# Patient Record
Sex: MALE | Race: WHITE | NOT HISPANIC OR LATINO
[De-identification: names, ages, dates, MRNs, and addresses within clinical notes are randomized per-mention and may not be internally consistent; named-entity substitution may affect disease eponyms.]

---

## 2018-03-19 ENCOUNTER — APPOINTMENT (OUTPATIENT)
Dept: INTERNAL MEDICINE | Facility: CLINIC | Age: 68
End: 2018-03-19
Payer: MEDICARE

## 2018-03-19 VITALS
SYSTOLIC BLOOD PRESSURE: 150 MMHG | HEART RATE: 86 BPM | TEMPERATURE: 97.6 F | OXYGEN SATURATION: 97 % | BODY MASS INDEX: 27 KG/M2 | RESPIRATION RATE: 14 BRPM | WEIGHT: 172 LBS | HEIGHT: 67 IN | DIASTOLIC BLOOD PRESSURE: 100 MMHG

## 2018-03-19 DIAGNOSIS — M54.31 SCIATICA, RIGHT SIDE: ICD-10-CM

## 2018-03-19 DIAGNOSIS — I10 ESSENTIAL (PRIMARY) HYPERTENSION: ICD-10-CM

## 2018-03-19 DIAGNOSIS — Z00.00 ENCOUNTER FOR GENERAL ADULT MEDICAL EXAMINATION W/OUT ABNORMAL FINDINGS: ICD-10-CM

## 2018-03-19 PROCEDURE — 99214 OFFICE O/P EST MOD 30 MIN: CPT

## 2018-03-19 RX ORDER — TRAMADOL HYDROCHLORIDE 50 MG/1
50 TABLET, COATED ORAL
Qty: 20 | Refills: 0 | Status: ACTIVE | COMMUNITY
Start: 2018-03-19 | End: 1900-01-01

## 2019-10-21 ENCOUNTER — TRANSCRIPTION ENCOUNTER (OUTPATIENT)
Age: 69
End: 2019-10-21

## 2023-07-06 ENCOUNTER — APPOINTMENT (OUTPATIENT)
Dept: ORTHOPEDIC SURGERY | Facility: CLINIC | Age: 73
End: 2023-07-06
Payer: MEDICARE

## 2023-07-06 VITALS — BODY MASS INDEX: 26.68 KG/M2 | WEIGHT: 170 LBS | HEIGHT: 67 IN

## 2023-07-06 DIAGNOSIS — I10 ESSENTIAL (PRIMARY) HYPERTENSION: ICD-10-CM

## 2023-07-06 DIAGNOSIS — M75.02 ADHESIVE CAPSULITIS OF LEFT SHOULDER: ICD-10-CM

## 2023-07-06 PROCEDURE — 99203 OFFICE O/P NEW LOW 30 MIN: CPT

## 2023-07-06 PROCEDURE — 73010 X-RAY EXAM OF SHOULDER BLADE: CPT | Mod: LT

## 2023-07-06 PROCEDURE — 73030 X-RAY EXAM OF SHOULDER: CPT | Mod: LT

## 2023-07-06 NOTE — IMAGING
[de-identified] : No swelling, no ecchymosis, no kirstie deformity\par Tenderness to palpation over greater tuberosity\par No instability or tenderness over AC joint\par 130/60/20/10\par 5-/5 supraspinatus, infraspinatus and subscapularis; there is pain with strength testing\par Positive Gramajo test, positive impingement sign\par Speed’s and yergason negative\par Motor and sensory intact distally\par  [Left] : left shoulder [Degenerative change] : Degenerative change [Type 2 acromion] : Type 2 acromion

## 2023-07-06 NOTE — ASSESSMENT
[FreeTextEntry1] : Atraumatic left shoulder pain and stiffness for about 2 weeks.  X-rays unremarkable today.  Exam consistent with evolving adhesive capsulitis.  Long discussion about the diagnosis and treatment options.  Start physical therapy home exercise program reviewed.  May continue activities as tolerated.  If pain becomes more of an issue we will contact us for possible injection.  Explained not very effective for this diagnosis.  More of a patients and working through the stiffness.  Follow-up in  3 months Progressive anemia

## 2023-07-06 NOTE — HISTORY OF PRESENT ILLNESS
[Gradual] : gradual [8] : 8 [0] : 0 [Localized] : localized [Sharp] : sharp [Intermittent] : intermittent [Household chores] : household chores [Rest] : rest [de-identified] : 07/06/23 pt presents here today with left shoulder pain on and off for couple weeks\par limited ROM\par no injury\par  [] : no [FreeTextEntry1] : left shoulder  [FreeTextEntry5] : no injury  [de-identified] : with activity  [de-identified] : nothing